# Patient Record
Sex: FEMALE | Race: WHITE | NOT HISPANIC OR LATINO
[De-identification: names, ages, dates, MRNs, and addresses within clinical notes are randomized per-mention and may not be internally consistent; named-entity substitution may affect disease eponyms.]

---

## 2018-03-07 PROBLEM — Z00.00 ENCOUNTER FOR PREVENTIVE HEALTH EXAMINATION: Status: ACTIVE | Noted: 2018-03-07

## 2018-03-20 ENCOUNTER — APPOINTMENT (OUTPATIENT)
Dept: PEDIATRIC ALLERGY IMMUNOLOGY | Facility: CLINIC | Age: 68
End: 2018-03-20
Payer: MEDICARE

## 2018-03-20 ENCOUNTER — LABORATORY RESULT (OUTPATIENT)
Age: 68
End: 2018-03-20

## 2018-03-20 VITALS
DIASTOLIC BLOOD PRESSURE: 80 MMHG | WEIGHT: 145 LBS | HEART RATE: 79 BPM | OXYGEN SATURATION: 97 % | HEIGHT: 65 IN | BODY MASS INDEX: 24.16 KG/M2 | SYSTOLIC BLOOD PRESSURE: 135 MMHG

## 2018-03-20 DIAGNOSIS — Z86.018 PERSONAL HISTORY OF OTHER BENIGN NEOPLASM: ICD-10-CM

## 2018-03-20 DIAGNOSIS — J18.9 PNEUMONIA, UNSPECIFIED ORGANISM: ICD-10-CM

## 2018-03-20 DIAGNOSIS — Z78.9 OTHER SPECIFIED HEALTH STATUS: ICD-10-CM

## 2018-03-20 PROCEDURE — 99205 OFFICE O/P NEW HI 60 MIN: CPT | Mod: GC

## 2018-03-20 PROCEDURE — 36415 COLL VENOUS BLD VENIPUNCTURE: CPT | Mod: GC

## 2018-03-20 RX ORDER — MONTELUKAST 10 MG/1
10 TABLET, FILM COATED ORAL
Qty: 30 | Refills: 0 | Status: ACTIVE | COMMUNITY
Start: 2018-03-20

## 2018-03-21 LAB
ALBUMIN SERPL ELPH-MCNC: 4.4 G/DL
ALP BLD-CCNC: 89 U/L
ALT SERPL-CCNC: 25 U/L
ANION GAP SERPL CALC-SCNC: 14 MMOL/L
APPEARANCE: CLEAR
AST SERPL-CCNC: 23 U/L
BASOPHILS # BLD AUTO: 0.02 K/UL
BASOPHILS NFR BLD AUTO: 0.2 %
BILIRUB SERPL-MCNC: 0.7 MG/DL
BILIRUBIN URINE: NEGATIVE
BLOOD URINE: NEGATIVE
BUN SERPL-MCNC: 20 MG/DL
CALCIUM SERPL-MCNC: 9.9 MG/DL
CD16+CD56+ CELLS # BLD: 322 /UL
CD16+CD56+ CELLS NFR BLD: 12 %
CD19 CELLS NFR BLD: 447 /UL
CD3 CELLS # BLD: 2085 /UL
CD3 CELLS NFR BLD: 71 %
CD3+CD4+ CELLS # BLD: 1844 /UL
CD3+CD4+ CELLS NFR BLD: 59 %
CD3+CD4+ CELLS/CD3+CD8+ CLL SPEC: 5.45 RATIO
CD3+CD8+ CELLS # SPEC: 339 /UL
CD3+CD8+ CELLS NFR BLD: 11 %
CELLS.CD3-CD19+/CELLS IN BLOOD: 16 %
CHLORIDE SERPL-SCNC: 99 MMOL/L
CO2 SERPL-SCNC: 25 MMOL/L
COLOR: YELLOW
CREAT SERPL-MCNC: 0.95 MG/DL
DEPRECATED KAPPA LC FREE/LAMBDA SER: 0.95 RATIO
EOSINOPHIL # BLD AUTO: 0.07 K/UL
EOSINOPHIL NFR BLD AUTO: 0.8 %
GLUCOSE QUALITATIVE U: NEGATIVE MG/DL
GLUCOSE SERPL-MCNC: 97 MG/DL
HBV SURFACE AB SER QL: NONREACTIVE
HCT VFR BLD CALC: 41.5 %
HGB BLD-MCNC: 13.1 G/DL
IGA SER QL IEP: 136 MG/DL
IGG SER QL IEP: 1210 MG/DL
IGM SER QL IEP: 152 MG/DL
IMM GRANULOCYTES NFR BLD AUTO: 0.1 %
KAPPA LC CSF-MCNC: 1.74 MG/DL
KAPPA LC SERPL-MCNC: 1.65 MG/DL
KETONES URINE: NEGATIVE
LEUKOCYTE ESTERASE URINE: NEGATIVE
LYMPHOCYTES # BLD AUTO: 2.94 K/UL
LYMPHOCYTES NFR BLD AUTO: 32.8 %
MAN DIFF?: NORMAL
MCHC RBC-ENTMCNC: 30.9 PG
MCHC RBC-ENTMCNC: 31.6 GM/DL
MCV RBC AUTO: 97.9 FL
MONOCYTES # BLD AUTO: 0.39 K/UL
MONOCYTES NFR BLD AUTO: 4.4 %
MUV AB SER-ACNC: POSITIVE
MUV IGG SER QL IA: 78.6 AU/ML
NEUTROPHILS # BLD AUTO: 5.52 K/UL
NEUTROPHILS NFR BLD AUTO: 61.7 %
NITRITE URINE: NEGATIVE
PH URINE: 6.5
PLATELET # BLD AUTO: 272 K/UL
POTASSIUM SERPL-SCNC: 4.5 MMOL/L
PROT SERPL-MCNC: 7.2 G/DL
PROTEIN URINE: NEGATIVE MG/DL
RBC # BLD: 4.24 M/UL
RBC # FLD: 13.9 %
RUBV IGG FLD-ACNC: 16.5 INDEX
RUBV IGG SER-IMP: POSITIVE
SODIUM SERPL-SCNC: 138 MMOL/L
SPECIFIC GRAVITY URINE: 1.02
UROBILINOGEN URINE: NEGATIVE MG/DL
VZV AB TITR SER: NEGATIVE
VZV IGG SER IF-ACNC: 70.1 INDEX
WBC # FLD AUTO: 8.95 K/UL

## 2018-03-22 LAB
G6PD SER-CCNC: 15.8 U/G HGB
MEV IGG FLD QL IA: >300 AU/ML
MEV IGG+IGM SER-IMP: POSITIVE

## 2018-03-23 LAB
C TETANI IGG SER-ACNC: 2.76 IU/ML
CH50 SERPL-MCNC: 59 U/ML

## 2018-03-26 LAB
POLIO 1 TITER BY  NEUTRALIZATION: NORMAL
POLIO 3 TITER BY  NEUTRALIZATION: NORMAL

## 2018-03-28 LAB — MANNAN BINDING LECTIN (MBL): 427 NG/ML

## 2018-03-30 LAB
COMPLEMENT, ALTERNATE PATHWAY (AH50): 99
DEPRECATED S PNEUM 1 IGG SER-MCNC: 20.1 MCG/ML
DEPRECATED S PNEUM12 AB SER-ACNC: 0.2 MCG/ML
DEPRECATED S PNEUM14 AB SER-ACNC: 1.9 MCG/ML
DEPRECATED S PNEUM17 IGG SER IA-MCNC: 7.1 MCG/ML
DEPRECATED S PNEUM18 IGG SER IA-MCNC: 3.3 MCG/ML
DEPRECATED S PNEUM19 IGG SER-MCNC: 7.6 MCG/ML
DEPRECATED S PNEUM19 IGG SER-MCNC: NORMAL MCG/ML
DEPRECATED S PNEUM2 IGG SER-MCNC: 15.1 MCG/ML
DEPRECATED S PNEUM20 IGG SER-MCNC: 0.5 MCG/ML
DEPRECATED S PNEUM22 IGG SER-MCNC: NORMAL MCG/ML
DEPRECATED S PNEUM23 AB SER-ACNC: 4.7 MCG/ML
DEPRECATED S PNEUM3 AB SER-ACNC: 8.8 MCG/ML
DEPRECATED S PNEUM34 IGG SER-MCNC: 1.8 MCG/ML
DEPRECATED S PNEUM4 AB SER-ACNC: 1.2 MCG/ML
DEPRECATED S PNEUM5 IGG SER-MCNC: 5.6 MCG/ML
DEPRECATED S PNEUM6 IGG SER-MCNC: 8.1 MCG/ML
DEPRECATED S PNEUM7 IGG SER-ACNC: 7 MCG/ML
DEPRECATED S PNEUM8 AB SER-ACNC: 0.9 MCG/ML
DEPRECATED S PNEUM9 AB SER-ACNC: 0.8 MCG/ML
DEPRECATED S PNEUM9 IGG SER-MCNC: 5.2 MCG/ML
HAEM INFLU B AB SER-MCNC: <0.11 MG/L
LPT PW BLD-NRATE: NORMAL
LPT PW BLD-NRATE: NORMAL
NBT BLD QL: NORMAL
STREPTOCOCCUS PNEUMONIAE SEROTYPE 11A: 1.5 MCG/ML
STREPTOCOCCUS PNEUMONIAE SEROTYPE 15B: 4 MCG/ML
STREPTOCOCCUS PNEUMONIAE SEROTYPE 33F: 0.5 MCG/ML

## 2018-04-10 ENCOUNTER — APPOINTMENT (OUTPATIENT)
Dept: PEDIATRIC ALLERGY IMMUNOLOGY | Facility: CLINIC | Age: 68
End: 2018-04-10

## 2018-07-18 ENCOUNTER — RESULT REVIEW (OUTPATIENT)
Age: 68
End: 2018-07-18

## 2019-01-21 DIAGNOSIS — M85.80 OTHER SPECIFIED DISORDERS OF BONE DENSITY AND STRUCTURE, UNSPECIFIED SITE: ICD-10-CM

## 2019-01-21 DIAGNOSIS — N95.1 MENOPAUSAL AND FEMALE CLIMACTERIC STATES: ICD-10-CM

## 2019-01-21 DIAGNOSIS — J98.4 OTHER DISORDERS OF LUNG: ICD-10-CM

## 2019-10-21 PROBLEM — N95.1 MENOPAUSAL AND FEMALE CLIMACTERIC STATES: Status: ACTIVE | Noted: 2019-10-21

## 2019-10-21 PROBLEM — M85.80 OSTEOPENIA: Status: ACTIVE | Noted: 2019-10-21

## 2019-10-21 PROBLEM — J98.4 LUNG DENSITY ON X-RAY: Status: ACTIVE | Noted: 2019-10-21

## 2019-10-21 LAB — CYTOLOGY CVX/VAG DOC THIN PREP: NORMAL

## 2020-07-28 ENCOUNTER — APPOINTMENT (OUTPATIENT)
Dept: OBGYN | Facility: CLINIC | Age: 70
End: 2020-07-28
Payer: MEDICARE

## 2020-07-28 VITALS
WEIGHT: 154 LBS | HEIGHT: 65 IN | SYSTOLIC BLOOD PRESSURE: 124 MMHG | DIASTOLIC BLOOD PRESSURE: 76 MMHG | BODY MASS INDEX: 25.66 KG/M2

## 2020-07-28 DIAGNOSIS — Z13.29 ENCOUNTER FOR SCREENING FOR OTHER SUSPECTED ENDOCRINE DISORDER: ICD-10-CM

## 2020-07-28 DIAGNOSIS — Z13.0 ENCOUNTER FOR SCREENING FOR OTHER SUSPECTED ENDOCRINE DISORDER: ICD-10-CM

## 2020-07-28 DIAGNOSIS — I10 ESSENTIAL (PRIMARY) HYPERTENSION: ICD-10-CM

## 2020-07-28 DIAGNOSIS — Z01.419 ENCOUNTER FOR GYNECOLOGICAL EXAMINATION (GENERAL) (ROUTINE) W/OUT ABNORMAL FINDINGS: ICD-10-CM

## 2020-07-28 DIAGNOSIS — Z78.9 OTHER SPECIFIED HEALTH STATUS: ICD-10-CM

## 2020-07-28 DIAGNOSIS — Z80.3 FAMILY HISTORY OF MALIGNANT NEOPLASM OF BREAST: ICD-10-CM

## 2020-07-28 DIAGNOSIS — N39.0 URINARY TRACT INFECTION, SITE NOT SPECIFIED: ICD-10-CM

## 2020-07-28 DIAGNOSIS — R23.2 FLUSHING: ICD-10-CM

## 2020-07-28 DIAGNOSIS — Z92.29 PERSONAL HISTORY OF OTHER DRUG THERAPY: ICD-10-CM

## 2020-07-28 DIAGNOSIS — Z87.898 PERSONAL HISTORY OF OTHER SPECIFIED CONDITIONS: ICD-10-CM

## 2020-07-28 DIAGNOSIS — Z12.39 ENCOUNTER FOR OTHER SCREENING FOR MALIGNANT NEOPLASM OF BREAST: ICD-10-CM

## 2020-07-28 DIAGNOSIS — Z80.0 FAMILY HISTORY OF MALIGNANT NEOPLASM OF DIGESTIVE ORGANS: ICD-10-CM

## 2020-07-28 DIAGNOSIS — Z23 ENCOUNTER FOR IMMUNIZATION: ICD-10-CM

## 2020-07-28 DIAGNOSIS — Z13.228 ENCOUNTER FOR SCREENING FOR OTHER SUSPECTED ENDOCRINE DISORDER: ICD-10-CM

## 2020-07-28 DIAGNOSIS — Z12.4 ENCOUNTER FOR SCREENING FOR MALIGNANT NEOPLASM OF CERVIX: ICD-10-CM

## 2020-07-28 DIAGNOSIS — Z12.11 ENCOUNTER FOR SCREENING FOR MALIGNANT NEOPLASM OF COLON: ICD-10-CM

## 2020-07-28 DIAGNOSIS — Z82.49 FAMILY HISTORY OF ISCHEMIC HEART DISEASE AND OTHER DISEASES OF THE CIRCULATORY SYSTEM: ICD-10-CM

## 2020-07-28 LAB
DATE COLLECTED: NORMAL
HEMOCCULT SP1 STL QL: NEGATIVE
QUALITY CONTROL: YES

## 2020-07-28 PROCEDURE — 82270 OCCULT BLOOD FECES: CPT

## 2020-07-28 PROCEDURE — G0101: CPT

## 2020-07-28 RX ORDER — LOSARTAN POTASSIUM 50 MG/1
50 TABLET, FILM COATED ORAL
Refills: 0 | Status: ACTIVE | COMMUNITY

## 2020-07-28 RX ORDER — ROSUVASTATIN CALCIUM 5 MG/1
5 TABLET, FILM COATED ORAL
Refills: 0 | Status: ACTIVE | COMMUNITY

## 2020-07-28 RX ORDER — AZELASTINE HYDROCHLORIDE AND FLUTICASONE PROPIONATE 137; 50 UG/1; UG/1
137-50 SPRAY, METERED NASAL TWICE DAILY
Qty: 5 | Refills: 0 | Status: DISCONTINUED | COMMUNITY
Start: 2018-03-20 | End: 2020-07-28

## 2020-07-28 RX ORDER — LOSARTAN POTASSIUM 25 MG/1
25 TABLET, FILM COATED ORAL DAILY
Qty: 30 | Refills: 3 | Status: DISCONTINUED | COMMUNITY
Start: 2018-03-20 | End: 2020-07-28

## 2020-07-28 NOTE — END OF VISIT
[FreeTextEntry3] : I, Sha Quintero, acted solely as a scribe for Dr. Pozo on this date 07/28/2020.\par All medical record entries made by the Scribe were at my, Dr. Pozo's direction and personally dictated by me on  07/28/2020. I have reviewed the chart and agree that the record accurately reflects my personal performance of the history, physical exam, assessment and plan. I have also personally directed, reviewed, and agreed with the chart.\par \par

## 2020-07-28 NOTE — REVIEW OF SYSTEMS
[Recent Wt Gain ___ Lbs] : recent [unfilled] ~Ulb weight gain [Breast Pain] : breast pain [Nl] : Musculoskeletal [FreeTextEntry2] : VERTIGO

## 2020-07-28 NOTE — PHYSICAL EXAM
[Awake] : awake [Alert] : alert [Examination Of The Breasts] : a normal appearance [No Masses] : no breast masses were palpable [No Discharge] : no discharge [Soft] : soft [Oriented x3] : oriented to person, place, and time [Labia Minora] : labia minora [Labia Majora] : labia major [Normal] : clitoris [Dry Mucosa] : dry mucosa [Atrophy] : atrophy [Pap Obtained] : a Pap smear was performed [No Bleeding] : there was no active vaginal bleeding [No Tenderness] : no rectal tenderness [Adnexa Absent] : absent bilaterally [Nl Sphincter Tone] : normal sphincter tone [Absent] : was absent [Acute Distress] : no acute distress [Mass] : no breast mass [Nipple Discharge] : no nipple discharge [Distended] : not distended [Tender] : non tender [Flat Affect] : affect not flat [Depressed Mood] : not depressed [Occult Blood] : occult blood test from digital rectal exam was negative

## 2020-07-28 NOTE — HISTORY OF PRESENT ILLNESS
[Last Mammogram ___] : Last Mammogram was [unfilled] [Last Pap ___] : Last cervical pap smear was [unfilled] [Last Bone Density ___] : Last bone density studies [unfilled] [Pregnancy History] : pregnancy history: [Total Preg ___] : [unfilled] [Full Term ___] : [unfilled] [Living ___] : [unfilled] [Definite:  ___ (Date)] : the last menstrual period was [unfilled] [Hot Flashes] : hot flashes [Menarche Age: ____] : age at menarche was [unfilled] [___ Year(s) Ago] : [unfilled] year(s) ago [Good] : being in good health [Healthy Diet] : a healthy diet [Regular Exercise] : regular exercise [Last Colonoscopy ___] : Last colonoscopy [unfilled] [HPV Vaccine NA Due to Age] : HPV vaccine not available to patient due to age [No] : no [de-identified] : B U/S 03/20/2012 BR 1 [Weight Concerns] : no concerns with her weight [Night Sweats] : no night sweats [Vaginal Itching] : no vaginal itching [Dyspareunia] : no dyspareunia [Mood Changes] : no mood changes [Sexually Active] : is not sexually active

## 2020-07-30 LAB — HPV HIGH+LOW RISK DNA PNL CVX: NOT DETECTED

## 2020-07-31 LAB — CYTOLOGY CVX/VAG DOC THIN PREP: ABNORMAL

## 2020-09-28 ENCOUNTER — RESULT REVIEW (OUTPATIENT)
Age: 70
End: 2020-09-28

## 2020-11-12 ENCOUNTER — RESULT REVIEW (OUTPATIENT)
Age: 70
End: 2020-11-12

## 2020-12-23 PROBLEM — Z01.419 ENCOUNTER FOR ANNUAL ROUTINE GYNECOLOGICAL EXAMINATION: Status: RESOLVED | Noted: 2020-07-28 | Resolved: 2020-12-23

## 2021-02-09 ENCOUNTER — APPOINTMENT (OUTPATIENT)
Dept: PEDIATRIC ALLERGY IMMUNOLOGY | Facility: CLINIC | Age: 71
End: 2021-02-09
Payer: MEDICARE

## 2021-02-09 VITALS — HEART RATE: 85 BPM | BODY MASS INDEX: 26.06 KG/M2 | WEIGHT: 156.59 LBS | OXYGEN SATURATION: 96 %

## 2021-02-09 DIAGNOSIS — Z71.89 OTHER SPECIFIED COUNSELING: ICD-10-CM

## 2021-02-09 DIAGNOSIS — Z13.228 ENCOUNTER FOR SCREENING FOR OTHER SUSPECTED ENDOCRINE DISORDER: ICD-10-CM

## 2021-02-09 DIAGNOSIS — Z13.0 ENCOUNTER FOR SCREENING FOR OTHER SUSPECTED ENDOCRINE DISORDER: ICD-10-CM

## 2021-02-09 DIAGNOSIS — Z13.29 ENCOUNTER FOR SCREENING FOR OTHER SUSPECTED ENDOCRINE DISORDER: ICD-10-CM

## 2021-02-09 PROCEDURE — 99214 OFFICE O/P EST MOD 30 MIN: CPT

## 2021-02-09 RX ORDER — IPRATROPIUM BROMIDE 21 UG/1
0.03 SPRAY NASAL
Refills: 0 | Status: ACTIVE | COMMUNITY
Start: 2021-02-09

## 2021-02-09 NOTE — REASON FOR VISIT
[Routine Follow-Up] : a routine follow-up visit for [Abnormal Labwork] : abnormal immunology labwork [Immune Evaluation] : immune evaluation [Spouse] : spouse

## 2021-02-09 NOTE — PHYSICAL EXAM
[Alert] : alert [Well Nourished] : well nourished [Healthy Appearance] : healthy appearance [No Acute Distress] : no acute distress [Normal Pupil & Iris Size/Symmetry] : normal pupil and iris size and symmetry [Well Developed] : well developed [No Discharge] : no discharge [No Photophobia] : no photophobia [Sclera Not Icteric] : sclera not icteric [Normal TMs] : both tympanic membranes were normal [Normal Nasal Mucosa] : the nasal mucosa was normal [Normal Lips/Tongue] : the lips and tongue were normal [Normal Outer Ear/Nose] : the ears and nose were normal in appearance [Normal Tonsils] : normal tonsils [No Thrush] : no thrush [Supple] : the neck was supple [Normal Rate and Effort] : normal respiratory rhythm and effort [No Crackles] : no crackles [No Retractions] : no retractions [Bilateral Audible Breath Sounds] : bilateral audible breath sounds [Normal Rate] : heart rate was normal  [Normal S1, S2] : normal S1 and S2 [No murmur] : no murmur [Regular Rhythm] : with a regular rhythm [Soft] : abdomen soft [Not Tender] : non-tender [Not Distended] : not distended [No HSM] : no hepato-splenomegaly [Normal Cervical Lymph Nodes] : cervical [Skin Intact] : skin intact  [No Rash] : no rash [No Skin Lesions] : no skin lesions [No clubbing] : no clubbing [No Edema] : no edema [No Cyanosis] : no cyanosis [No Motor Deficits] : the motor exam was normal [Normal Mood] : mood was normal [Normal Affect] : affect was normal [Alert, Awake, Oriented as Age-Appropriate] : alert, awake, oriented as age appropriate [Pale mucosa] : no pale mucosa [Wheezing] : no wheezing was heard

## 2021-02-09 NOTE — HISTORY OF PRESENT ILLNESS
[de-identified] : 69 y/o F with HTN, history of recurrent UTIs, CD4 lymphocytosis of unclear etiology, intermittent hypogammaglobulinemia. Here for follow up. Last seen 3/2018.\par \par Prior workup showed:\par Preliminary cellular immune evaluation showed CD4+ lymphocytosis of unclear etiology or clinical significance. Repeat lymphocyte enumeration shows resolved T cell lymphocytosis. She had unremarkable lymphocyte proliferation studies which are in vitro functional tests. Thus far, there is no obvious evidence of cellular defect.\par \par Preliminary humoral immune evaluation shows non protective titers to varicella, polio type 1 and hep B (but she was not vaccinated), with otherwise unremarkable IgG, IgA, IgM and protective titers to a variety of live viral vaccines and inactivated vaccines. The previously observed hypogammaglobulinemia appeared to have resolved as of March 2018. I recommended vaccination to Haemophilus b (Hib), shingles and polio vaccinations, and she appears to have responded adequately to varicella and polio vaccination, but not to Hib. This may represent a subtle humoral immunodeficiency, such as specific antibody defect, which may benefit from IgG replacement.\par She had unremarkable complement and phagocyte evaluations at that time. \par \par She reports getting 2 doses of shingles vaccine (not sure which one) in 2019. \par \par since last visit, she was diagnosed with localized lung CA and had surgery on 11/12/20. No radiation or chemotherapy was used. At around that time, she was also diagnosed with PNA in Dec for which she was Rx'ed Abx. Otherwise, she denies any other Abx use or infections since last visit.\par \par she is also wondering if she should get COVID vaccination. She denies any hx of adverse reactions to known vaccine components. She denies a history of adverse reaction to polyethylene glycol, polysorbate. She denies history of adverse reactions to other vaccines or injectables. She denies having any any dermatologic fillers.\par \par \par \par She saw Dr. Velásquez in Jan 2021\par labs showed elevated CD4 (1688, ref range 490 - 1600) otherwise unremarkable CD3 (1968), CD8 (271) CD19 (375), CD16/56 (173)\par unremarkable CBC w/ diff\par protective titers to pneumococcus (14 of 23, 9 of 13), tetanus (2.29), diphtheria (0.88)\par low normal IgG (611, ref range 586-1602) with unremarkable IgG1-IgG4, IgA (90), IgM (91)

## 2021-02-09 NOTE — CONSULT LETTER
[DrKarina  ___] : Dr. ROSE [Dear  ___] : Dear  [unfilled], [Courtesy Letter:] : I had the pleasure of seeing your patient, [unfilled], in my office today. [Please see my note below.] : Please see my note below. [Sincerely,] : Sincerely, [DrKarina ___] : Dr. ROSE [FreeTextEntry3] : Trev Barrios III  MPH, MD, PhD, FACP, FACAAI, FAAAAI \par , Departments of Medicine and Pediatrics \par [employer] [facility] [employer] [facility] \par , [facility] \par Attending Physician, Division of Allergy & Immunology [facility]\par \par \par

## 2021-02-09 NOTE — REVIEW OF SYSTEMS
[Cough] : cough [Nl] : Genitourinary [Immunizations are up to date] : Immunizations are up to date [Received Influenza Vaccine this Past Year] : patient has received the Influenza vaccine this past year [Difficulty Breathing] : no dyspnea [SOB at Rest] : no shortness of breath at rest [SOB with Exertion] : no dyspnea on exertion [Nocturnal Awakening] : no nocturnal awakening with shortness of breath [Sputum Production] : not coughing up sputum [Congested In The Chest] : not feeling ~L congested in the chest [Wheezing Worsens With Exercise] : wheezing does not worsen with exercise [Wheezing Worse During Cold Weather] : wheezing not ~L worse during cold weather [Wheezing] : no wheezing [FreeTextEntry6] : see hpi [FreeTextEntry1] : see hpi for recurrent UTI [de-identified] : see hpi

## 2021-03-16 ENCOUNTER — NON-APPOINTMENT (OUTPATIENT)
Age: 71
End: 2021-03-16

## 2021-04-05 ENCOUNTER — APPOINTMENT (OUTPATIENT)
Dept: PEDIATRIC ALLERGY IMMUNOLOGY | Facility: CLINIC | Age: 71
End: 2021-04-05
Payer: MEDICARE

## 2021-04-05 DIAGNOSIS — D72.820 LYMPHOCYTOSIS (SYMPTOMATIC): ICD-10-CM

## 2021-04-05 DIAGNOSIS — D80.1 NONFAMILIAL HYPOGAMMAGLOBULINEMIA: ICD-10-CM

## 2021-04-05 DIAGNOSIS — D80.3 SELECTIVE DEFICIENCY OF IMMUNOGLOBULIN G [IGG] SUBCLASSES: ICD-10-CM

## 2021-04-05 PROCEDURE — 99213 OFFICE O/P EST LOW 20 MIN: CPT | Mod: 95

## 2021-04-05 NOTE — REVIEW OF SYSTEMS
[Cough] : cough [Nl] : Genitourinary [Difficulty Breathing] : no dyspnea [SOB at Rest] : no shortness of breath at rest [SOB with Exertion] : no dyspnea on exertion [Sputum Production] : not coughing up sputum [Nocturnal Awakening] : no nocturnal awakening with shortness of breath [Congested In The Chest] : not feeling ~L congested in the chest [Wheezing Worsens With Exercise] : wheezing does not worsen with exercise [Wheezing Worse During Cold Weather] : wheezing not ~L worse during cold weather [Wheezing] : no wheezing [FreeTextEntry6] : see hpi - no change in baseline cough [FreeTextEntry1] : see hpi for recurrent UTI [de-identified] : see hpi

## 2021-04-05 NOTE — PHYSICAL EXAM
[No Acute Distress] : no acute distress [Alert, Awake, Oriented as Age-Appropriate] : alert, awake, oriented as age appropriate

## 2021-04-05 NOTE — HISTORY OF PRESENT ILLNESS
[Home] : at home, [unfilled] , at the time of the visit. [Other Location: e.g. Home (Enter Location, City,State)___] : at [unfilled] [Spouse] : spouse [Verbal consent obtained from patient] : the patient, [unfilled] [de-identified] : 71 y/o F with HTN, history of recurrent UTIs, CD4 lymphocytosis of unclear etiology, intermittent hypogammaglobulinemia and localized lien CA s/p surgery w/o need for chemotherapy or radiation therapy. Here for follow up. Last seen 2/2021.\par \par since last visit, no interval infections or Abx use. Here to discuss recent labs from 3/2021.she is feeling well and is in her usual state of health. Per patient, her lung CA is still in remission.\par \par labs from [facility] from 3/4/21\par unremarkable CD3 (1699), CD4 (1430), CD8 (271) T cell counts\par unremarkable CBC w/ diff\par decreased IgG2 (72, ref range 130-555), with otherwise unremarkable IgG (635), IgG1 (427), IgG3 (33), IgG4 (8), IgM (74), IgA (89)\par protective titers to varicella\par NOT DONE DUE TO LAB ERROR: CD19 B & CD16/56 NK cell counts

## 2021-04-05 NOTE — CONSULT LETTER
[DrKarina  ___] : Dr. ROSE [DrKarina ___] : Dr. ROSE [Dear  ___] : Dear  [unfilled], [Courtesy Letter:] : I had the pleasure of seeing your patient, [unfilled], in my office today. [Please see my note below.] : Please see my note below. [Sincerely,] : Sincerely, [FreeTextEntry3] : Trev Barrios III  MPH, MD, PhD, FACP, FACAAI, FAAAAI \par , Departments of Medicine and Pediatrics \par [employer] [facility] [employer] [facility] \par , [facility] [employer] [facility] \par Attending Physician, Division of Allergy & Immunology [facility]\par \par \par

## 2021-05-14 ENCOUNTER — TRANSCRIPTION ENCOUNTER (OUTPATIENT)
Age: 71
End: 2021-05-14